# Patient Record
Sex: MALE | Race: WHITE | NOT HISPANIC OR LATINO | Employment: OTHER | URBAN - METROPOLITAN AREA
[De-identification: names, ages, dates, MRNs, and addresses within clinical notes are randomized per-mention and may not be internally consistent; named-entity substitution may affect disease eponyms.]

---

## 2024-04-13 ENCOUNTER — HOSPITAL ENCOUNTER (OUTPATIENT)
Facility: HOSPITAL | Age: 69
Discharge: HOME | End: 2024-04-13
Attending: EMERGENCY MEDICINE | Admitting: EMERGENCY MEDICINE
Payer: MEDICARE

## 2024-04-13 ENCOUNTER — APPOINTMENT (OUTPATIENT)
Dept: GENERAL RADIOLOGY | Facility: HOSPITAL | Age: 69
End: 2024-04-13
Payer: MEDICARE

## 2024-04-13 VITALS
DIASTOLIC BLOOD PRESSURE: 66 MMHG | HEIGHT: 62 IN | TEMPERATURE: 97.8 F | HEART RATE: 70 BPM | WEIGHT: 200 LBS | OXYGEN SATURATION: 95 % | BODY MASS INDEX: 36.8 KG/M2 | RESPIRATION RATE: 18 BRPM | SYSTOLIC BLOOD PRESSURE: 152 MMHG

## 2024-04-13 DIAGNOSIS — S82.831A CLOSED FRACTURE OF DISTAL END OF RIGHT FIBULA, UNSPECIFIED FRACTURE MORPHOLOGY, INITIAL ENCOUNTER: Primary | ICD-10-CM

## 2024-04-13 PROCEDURE — 73630 X-RAY EXAM OF FOOT: CPT

## 2024-04-13 PROCEDURE — 99203 OFFICE O/P NEW LOW 30 MIN: CPT | Performed by: NURSE PRACTITIONER

## 2024-04-13 PROCEDURE — 73610 X-RAY EXAM OF ANKLE: CPT

## 2024-04-13 PROCEDURE — G0463 HOSPITAL OUTPT CLINIC VISIT: HCPCS | Performed by: NURSE PRACTITIONER

## 2024-04-13 NOTE — [facility] PROVIDER NOTE
"Subjective   History of Present Illness  The patient is a 68-year-old male who presents to the ER with right ankle and foot pain since last Sunday.  Patient reports he had bent over to get something out of the refrigerator became dizzy and fell with landed with his weight on his right foot and ankle.  Patient reports he has been \"hobbling\" around all week and has just had increased pain and swelling.    History provided by:  Patient   used: No        Review of Systems   Musculoskeletal:         Patient with pain in the right ankle and foot.       History reviewed. No pertinent past medical history.    Allergies   Allergen Reactions    Lisinopril Other (See Comments)     coughing    Other reaction(s): Other (See Comments)   coughing   coughing       History reviewed. No pertinent surgical history.    History reviewed. No pertinent family history.    Social History     Socioeconomic History    Marital status:            Objective   Physical Exam  Vitals and nursing note reviewed.   Constitutional:       Appearance: Normal appearance.   Cardiovascular:      Pulses:           Dorsalis pedis pulses are 2+ on the right side.        Posterior tibial pulses are 2+ on the right side.   Musculoskeletal:         General: Normal range of motion.      Right foot: Decreased range of motion.        Feet:       Comments: Patient with pain in the right foot and ankle after he fell landed on his right foot and ankle. Patient with pain and bruising to the lateral side of the right foot. Patient with pedal and post tibial pulses noted, cap refill less than 3 seconds. Patient with limited ROM due to pain and swelling.    Feet:      Comments: Patient with pain, swelling, bruising noted  to the right lateral foot and ankle.  Patient with pedal and posttibial pulses noted.  Patient with cap refill less than 3 seconds. Patient with limited ROM due to pain and swelling.    Skin:     General: Skin is warm and dry. " "     Findings: Bruising present.   Neurological:      General: No focal deficit present.      Mental Status: He is alert and oriented to person, place, and time.   Psychiatric:         Mood and Affect: Mood normal.         Behavior: Behavior normal. Behavior is cooperative.         Procedures           ED Course  ED Course as of 04/13/24 1749   Sat Apr 13, 2024   1722 XR ANKLE 3+ VW RIGHT, XR FOOT 3+ VW RIGHT     Date of Exam: 4/13/2024 5:10 PM EDT     Indication: fall, right ankle/foot injury     Comparison: None available.     Findings/  IMPRESSION:  Impression:  Multiple views of the right ankle and right foot were obtained. There is a minimally displaced oblique fracture of the distal fibula below the syndesmosis. No other fracture is seen. Ankle mortise appears well aligned. There is soft tissue swelling about   the ankle and within the foot. Vascular calcifications are noted.      [DS]      ED Course User Index  [DS] Rosa Tran APRN                                           Medical Decision Making  The patient is a 68-year-old male who presents to the ER with right ankle and foot pain since last Sunday.  Patient reports he had bent over to get something out of the refrigerator became dizzy and fell with landed with his weight on his right foot and ankle.  Patient reports he has been \"hobbling\" around all week and has just had increased pain and swelling.    The Pt was found to have a closed oblique fracture of distal fibula  on XR. The Pt is otherwise well appearing, hemodynamically stable, and shows no evidence of neurovascular injury or compartment syndrome. Patient was placed in cam walking boot, use crutches, and follow up with orthopedic.     Amount and/or Complexity of Data Reviewed  Radiology: ordered. Decision-making details documented in ED Course.    Risk  OTC drugs.        Final diagnoses:   Closed fracture of distal end of right fibula, unspecified fracture morphology, initial encounter "       ED Disposition  ED Disposition       ED Disposition   Discharge    Condition   Stable    Comment   --               Carlton Pires MD  [address] [place] [address] [phone]    Schedule an appointment as soon as possible for a visit in 1 week  As needed, If symptoms worsen    Artur Flowers MD  [address] [place] [id number] [phone]      orthopedic you can follow-up with.    Elmo Machado II, MD  [address] [facility] [place] [address]  [phone]    Call   Is orthopedic you can follow-up with.    MARISSA Srivastava DPM  [address] [place] [address] [phone]    Call in 1 week  Is podiatry you can also follow-up with.         Medication List      No changes were made to your prescriptions during this visit.

## 2024-04-13 NOTE — DISCHARGE INSTRUCTIONS
You can also follow-up with orthopedics Dr. Machado, or Dr. Flowers or podiatry Dr. Srivastava for further evaluation and treatment.  Make sure your insurance does not require a speciality referral before seeing, if so you need to see PCP     Ice and elevate to help with pain and swelling.     Tylenol/Motrin as needed for pain/fevers    Use the Walking boot, and crutches till you see orthopedic or podiatry     Make sure patient is drinking plenty of fluids.    Return for any new or worsening symptoms.